# Patient Record
Sex: MALE | Race: BLACK OR AFRICAN AMERICAN | NOT HISPANIC OR LATINO | Employment: OTHER | URBAN - METROPOLITAN AREA
[De-identification: names, ages, dates, MRNs, and addresses within clinical notes are randomized per-mention and may not be internally consistent; named-entity substitution may affect disease eponyms.]

---

## 2017-02-23 ENCOUNTER — PRE VISIT (OUTPATIENT)
Dept: UROLOGY | Facility: CLINIC | Age: 32
End: 2017-02-23

## 2017-02-23 NOTE — TELEPHONE ENCOUNTER
PREVISIT INFORMATION                                                    Quan Hayward scheduled for future visit at [facility] specialty clinics.    Patient is scheduled to see Dr. Vidal on 2/27/17  Reason for visit: azoospermia - discuss options   Referring provider: Earnest Barnett  Has patient seen previous specialist? YES  Medical Records:  Available in chart.  Patient was previously seen at a [place] or [facility] facility.     REVIEW                                                      New patient packet mailed to patient: Yes  Medication reconciliation complete: No      PLAN/FOLLOW-UP NEEDED                                                      Patient Reminders Given:    Informed patient to bring an updated list of allergies, medications, pharmacy details and insurance information. Directed patient to come to the 2nd floor, check-in #4 for their appointment. Informed patient to call back if appointment needs to be cancelled or rescheduled at [phone].    Reminded patient to bring any outside records regarding this appointment or have them faxed to clinic at [phone].    Reminded patient to complete and bring in urology questionnaire. Also for patient to please come with a full bladder and to ask , if early to get staff member for sample.

## 2017-04-17 ENCOUNTER — TELEPHONE (OUTPATIENT)
Dept: UROLOGY | Facility: CLINIC | Age: 32
End: 2017-04-17

## 2017-04-17 ENCOUNTER — OFFICE VISIT (OUTPATIENT)
Dept: UROLOGY | Facility: CLINIC | Age: 32
End: 2017-04-17
Payer: COMMERCIAL

## 2017-04-17 VITALS
OXYGEN SATURATION: 99 % | DIASTOLIC BLOOD PRESSURE: 82 MMHG | HEART RATE: 73 BPM | BODY MASS INDEX: 34.51 KG/M2 | SYSTOLIC BLOOD PRESSURE: 116 MMHG | WEIGHT: 268.8 LBS | TEMPERATURE: 98.2 F

## 2017-04-17 DIAGNOSIS — N46.01 AZOOSPERMIA: ICD-10-CM

## 2017-04-17 DIAGNOSIS — Q98.4 KLINEFELTER'S SYNDROME: Primary | ICD-10-CM

## 2017-04-17 PROCEDURE — 99202 OFFICE O/P NEW SF 15 MIN: CPT | Performed by: UROLOGY

## 2017-04-17 RX ORDER — CEFAZOLIN SODIUM 1 G/50ML
3 SOLUTION INTRAVENOUS
Status: CANCELLED | OUTPATIENT
Start: 2017-04-17

## 2017-04-17 RX ORDER — ANASTROZOLE 1 MG/1
1 TABLET ORAL DAILY
Qty: 90 TABLET | Refills: 1 | Status: SHIPPED | OUTPATIENT
Start: 2017-04-17 | End: 2017-07-10

## 2017-04-17 RX ORDER — CEFAZOLIN SODIUM 1 G/3ML
1 INJECTION, POWDER, FOR SOLUTION INTRAMUSCULAR; INTRAVENOUS SEE ADMIN INSTRUCTIONS
Status: CANCELLED | OUTPATIENT
Start: 2017-04-17

## 2017-04-17 ASSESSMENT — PAIN SCALES - GENERAL: PAINLEVEL: NO PAIN (0)

## 2017-04-17 NOTE — TELEPHONE ENCOUNTER
Left the patient a detailed message with surgery details. Left my direct number for any questions. Surgery packet is being sent out today.

## 2017-04-17 NOTE — MR AVS SNAPSHOT
After Visit Summary   4/17/2017    Quan Quintero [facility]    MRN: [id number]           Patient Information     Date Of Birth          1985        Visit Information        Provider Department      4/17/2017 1:00 PM Anthony Vidal MD [facility]        Today's Diagnoses     Klinefelter's syndrome    -  1    Azoospermia           Follow-ups after your visit        Your next 10 appointments already scheduled     May 15, 2017  7:30 AM CDT   LAB with [facility] LAB   [facility] ([address] [address]   [phone]           OUTSIDE LABS:  Please include name of facility and Physician that is requesting outside labs be drawn.  Please indicate if labs are fasting or non-fasting on appt notes.  Be as specific as you can on which labs are being drawn.            Jun 29, 2017  8:40 AM CDT   (Arrive by 8:25 AM)   Post-Op with Anthony Vidal MD   [facility] ([facility])    [address] [place] [id number] [phone]              Future tests that were ordered for you today     Open Future Orders        Priority Expected Expires Ordered    Follicle stimulating hormone Routine 5/17/2017 6/17/2017 4/17/2017    Testosterone total Routine 5/17/2017 6/17/2017 4/17/2017    Estradiol ultrasensitive Routine 5/17/2017 6/17/2017 4/17/2017    ALT Routine 5/17/2017 6/17/2017 4/17/2017    AST Routine 5/17/2017 6/17/2017 4/17/2017            Who to contact     If you have questions or need follow up information about today's clinic visit or your schedule please contact [facility] directly at [phone].  Normal or non-critical lab and imaging results will be communicated to you by MyChart, letter or phone within 4 business days after the clinic has received the results. If you do not hear from us within 7 days,  please contact the clinic through [employer] or phone. If you have a critical or abnormal lab result, we will notify you by phone as soon as possible.  Submit refill requests through Vaunte or call your pharmacy and they will forward the refill request to us. Please allow 3 business days for your refill to be completed.          Additional Information About Your Visit        Vaunte Information     Vaunte is an electronic gateway that provides easy, online access to your medical records. With Vaunte, you can request a clinic appointment, read your test results, renew a prescription or communicate with your care team.     To sign up for Vaunte visit the website at [email or web address]   You will be asked to enter the access code listed below, as well as some personal information. Please follow the directions to create your username and password.     Your access code is: [email or web address]  Expires: 2017  2:07 PM     Your access code will  in 90 days. If you need help or a new code, please contact your [facility] or call [phone] for assistance.        Care EveryWhere ID     This is your Care EveryWhere ID. This could be used by other organizations to access your [place] medical records  [id number]        Your Vitals Were     Pulse Temperature Pulse Oximetry BMI (Body Mass Index)          73 98.2  F (36.8  C) (Temporal) 99% 34.51 kg/m2         Blood Pressure from Last 3 Encounters:   17 116/82   16 109/78   09/14/15 104/72    Weight from Last 3 Encounters:   17 121.9 kg (268 lb 12.8 oz)   16 126.5 kg (278 lb 14.4 oz)   09/14/15 111.6 kg (246 lb)                 Today's Medication Changes          These changes are accurate as of: 17  2:07 PM.  If you have any questions, ask your nurse or doctor.               Start taking these medicines.        Dose/Directions    anastrozole 1 MG tablet   Commonly known as:  ARIMIDEX   Used for:   Klinefelter's syndrome, Azoospermia   Started by:  Anthony Vidal MD        Dose:  1 mg   Take 1 tablet (1 mg) by mouth daily   Quantity:  90 tablet   Refills:  1            Where to get your medicines      These medications were sent to [id number] - [place], [place] - [address] AT [facility] OF  & [facility]  [address] [place] [place] [address]     Phone:  [phone]     anastrozole 1 MG tablet                Primary Care Provider Office Phone # Fax #    Jere Viramontes [phone]       [facility] [address] [facility] [address]        Thank you!     Thank you for choosing [facility]  for your care. Our goal is always to provide you with excellent care. Hearing back from our patients is one way we can continue to improve our services. Please take a few minutes to complete the written survey that you may receive in the mail after your visit with us. Thank you!             Your Updated Medication List - Protect others around you: Learn how to safely use, store and throw away your medicines at www.disposemymeds.org.          This list is accurate as of: 4/17/17  2:07 PM.  Always use your most recent med list.                   Brand Name Dispense Instructions for use    anastrozole 1 MG tablet    ARIMIDEX    90 tablet    Take 1 tablet (1 mg) by mouth daily       ibuprofen 600 MG tablet    ADVIL/MOTRIN     TK ONE T PO Q 6-8 H PRN P

## 2017-04-17 NOTE — PROGRESS NOTES
Past pt of Dr. Barnett  Here for fertility follow-up, new patient to urology clinic.    Pt has normal Y-chromosome microdeletion testing.    Labs from 2015:  He has Klinefelter's Syndrome 47xxy.  Normal volume, normal pH azoospermia x 3.  Testosterone 167, E2 25.2, LH 20.2.  Assume high FSH also.  Prolactin 13.4     He has small firm bilateral testes consistent with KS.    Discussed fertility may be possible but would require surgical sperm acquisition and IVF/ICSI.    Discussed options of female egg retrieval first, and thaw eggs if sperm found on fresh TESE.  Other option is pursue male biopsy first and cryopreserve sperm if found and then pursue IVF.    Discussed with them that most likely we would NOT find sperm.  Discussed that even if sperm are found that likely they would not survive freezing given low quality/ quantity.  At the same time it's such a low chance of finding sperm I would hate for her to bank eggs first as they are not open to sperm donor.   They want to pursue biopsy to prove there is not sperm present.    A-  Klinefelter's Syndrome     Plan  - schedule TESE/sperm extraction.  Discussed with them there is very little chance of finding sperm.  - needs ID labs.  - start Arimidex.  - will also check inhibin.    20min visit, over 50% face to face in counseling/discussion of above issues.

## 2017-04-17 NOTE — NURSING NOTE
"Quan Hayward's goals for this visit include:   Chief Complaint   Patient presents with     Consult     Azoospermia Testicular hypofunction infertfility ref by Ericka        He requests these members of his care team be copied on today's visit information: YES PCP    Referring Provider:  Earnest Barnett MD   [facility]  [address] [facility] [address]  [place] [address]    Initial /82 (BP Location: Left arm, Patient Position: Chair, Cuff Size: Adult Large)  Pulse 73  Temp 98.2  F (36.8  C) (Temporal)  Wt 121.9 kg (268 lb 12.8 oz)  SpO2 99%  BMI 34.51 kg/m2 Estimated body mass index is 34.51 kg/(m^2) as calculated from the following:    Height as of 12/5/16: 1.88 m (6' 2\").    Weight as of this encounter: 121.9 kg (268 lb 12.8 oz).  BP completed using cuff size: large        "

## 2017-04-18 DIAGNOSIS — Q98.4 KLINEFELTER'S SYNDROME: Primary | ICD-10-CM

## 2017-05-15 DIAGNOSIS — Q98.4 KLINEFELTER'S SYNDROME: ICD-10-CM

## 2017-05-15 DIAGNOSIS — N46.01 AZOOSPERMIA: ICD-10-CM

## 2017-05-15 LAB
ALT SERPL W P-5'-P-CCNC: 30 U/L (ref 0–70)
AST SERPL W P-5'-P-CCNC: 19 U/L (ref 0–45)
FSH SERPL-ACNC: 33.7 IU/L (ref 0.7–10.8)
HBV SURFACE AG SERPL QL IA: NONREACTIVE
HCV AB SERPL QL IA: NORMAL
HIV 1+2 AB+HIV1 P24 AG SERPL QL IA: NORMAL

## 2017-05-15 PROCEDURE — 84450 TRANSFERASE (AST) (SGOT): CPT | Performed by: UROLOGY

## 2017-05-15 PROCEDURE — 84460 ALANINE AMINO (ALT) (SGPT): CPT | Performed by: UROLOGY

## 2017-05-15 PROCEDURE — 84403 ASSAY OF TOTAL TESTOSTERONE: CPT | Performed by: UROLOGY

## 2017-05-15 PROCEDURE — 82670 ASSAY OF TOTAL ESTRADIOL: CPT | Performed by: UROLOGY

## 2017-05-15 PROCEDURE — 87340 HEPATITIS B SURFACE AG IA: CPT | Performed by: UROLOGY

## 2017-05-15 PROCEDURE — 86803 HEPATITIS C AB TEST: CPT | Performed by: UROLOGY

## 2017-05-15 PROCEDURE — 36415 COLL VENOUS BLD VENIPUNCTURE: CPT | Performed by: UROLOGY

## 2017-05-15 PROCEDURE — 86780 TREPONEMA PALLIDUM: CPT | Performed by: UROLOGY

## 2017-05-15 PROCEDURE — 87389 HIV-1 AG W/HIV-1&-2 AB AG IA: CPT | Performed by: UROLOGY

## 2017-05-15 PROCEDURE — 83001 ASSAY OF GONADOTROPIN (FSH): CPT | Performed by: UROLOGY

## 2017-05-16 LAB
T PALLIDUM IGG+IGM SER QL: NEGATIVE
TESTOST SERPL-MCNC: 233 NG/DL (ref 240–950)

## 2017-05-17 LAB — ESTRADIOL SERPL HS-MCNC: 10 PG/ML (ref 10–40)

## 2017-06-03 ENCOUNTER — TRANSFERRED RECORDS (OUTPATIENT)
Dept: HEALTH INFORMATION MANAGEMENT | Facility: CLINIC | Age: 32
End: 2017-06-03

## 2017-06-14 DIAGNOSIS — N46.01 AZOOSPERMIA: Primary | ICD-10-CM

## 2017-06-19 ENCOUNTER — ANESTHESIA EVENT (OUTPATIENT)
Dept: SURGERY | Facility: AMBULATORY SURGERY CENTER | Age: 32
End: 2017-06-19

## 2017-06-20 ENCOUNTER — HOSPITAL ENCOUNTER (OUTPATIENT)
Facility: AMBULATORY SURGERY CENTER | Age: 32
End: 2017-06-20
Attending: UROLOGY

## 2017-06-20 ENCOUNTER — SURGERY (OUTPATIENT)
Age: 32
End: 2017-06-20

## 2017-06-20 ENCOUNTER — TELEPHONE (OUTPATIENT)
Dept: UROLOGY | Facility: CLINIC | Age: 32
End: 2017-06-20

## 2017-06-20 ENCOUNTER — ANESTHESIA (OUTPATIENT)
Dept: SURGERY | Facility: AMBULATORY SURGERY CENTER | Age: 32
End: 2017-06-20

## 2017-06-20 VITALS
SYSTOLIC BLOOD PRESSURE: 116 MMHG | DIASTOLIC BLOOD PRESSURE: 84 MMHG | TEMPERATURE: 97.1 F | HEIGHT: 75 IN | RESPIRATION RATE: 22 BRPM | WEIGHT: 268 LBS | OXYGEN SATURATION: 98 % | BODY MASS INDEX: 33.32 KG/M2

## 2017-06-20 DIAGNOSIS — Q98.0 XXY KLINEFELTER'S SYNDROME: Primary | ICD-10-CM

## 2017-06-20 DIAGNOSIS — Q98.4 KLINEFELTER SYNDROME: Primary | ICD-10-CM

## 2017-06-20 PROCEDURE — 89264 IDENTIFY SPERM TISSUE: CPT | Mod: 91

## 2017-06-20 RX ORDER — ONDANSETRON 4 MG/1
4 TABLET, ORALLY DISINTEGRATING ORAL EVERY 30 MIN PRN
Status: DISCONTINUED | OUTPATIENT
Start: 2017-06-20 | End: 2017-06-21 | Stop reason: HOSPADM

## 2017-06-20 RX ORDER — ACETAMINOPHEN 325 MG/1
975 TABLET ORAL ONCE
Status: COMPLETED | OUTPATIENT
Start: 2017-06-20 | End: 2017-06-20

## 2017-06-20 RX ORDER — FENTANYL CITRATE 50 UG/ML
25-50 INJECTION, SOLUTION INTRAMUSCULAR; INTRAVENOUS
Status: DISCONTINUED | OUTPATIENT
Start: 2017-06-20 | End: 2017-06-21 | Stop reason: HOSPADM

## 2017-06-20 RX ORDER — HYDROCODONE BITARTRATE AND ACETAMINOPHEN 5; 325 MG/1; MG/1
1-2 TABLET ORAL ONCE
Status: DISCONTINUED | OUTPATIENT
Start: 2017-06-20 | End: 2017-06-21 | Stop reason: HOSPADM

## 2017-06-20 RX ORDER — ONDANSETRON 2 MG/ML
4 INJECTION INTRAMUSCULAR; INTRAVENOUS EVERY 30 MIN PRN
Status: DISCONTINUED | OUTPATIENT
Start: 2017-06-20 | End: 2017-06-21 | Stop reason: HOSPADM

## 2017-06-20 RX ORDER — MEPERIDINE HYDROCHLORIDE 25 MG/ML
12.5 INJECTION INTRAMUSCULAR; INTRAVENOUS; SUBCUTANEOUS
Status: DISCONTINUED | OUTPATIENT
Start: 2017-06-20 | End: 2017-06-21 | Stop reason: HOSPADM

## 2017-06-20 RX ORDER — PROPOFOL 10 MG/ML
INJECTION, EMULSION INTRAVENOUS PRN
Status: DISCONTINUED | OUTPATIENT
Start: 2017-06-20 | End: 2017-06-20

## 2017-06-20 RX ORDER — SODIUM CHLORIDE, SODIUM LACTATE, POTASSIUM CHLORIDE, CALCIUM CHLORIDE 600; 310; 30; 20 MG/100ML; MG/100ML; MG/100ML; MG/100ML
INJECTION, SOLUTION INTRAVENOUS CONTINUOUS
Status: DISCONTINUED | OUTPATIENT
Start: 2017-06-20 | End: 2017-06-21 | Stop reason: HOSPADM

## 2017-06-20 RX ORDER — PROPOFOL 10 MG/ML
INJECTION, EMULSION INTRAVENOUS CONTINUOUS PRN
Status: DISCONTINUED | OUTPATIENT
Start: 2017-06-20 | End: 2017-06-20

## 2017-06-20 RX ORDER — PROMETHAZINE HYDROCHLORIDE 25 MG/ML
12.5 INJECTION, SOLUTION INTRAMUSCULAR; INTRAVENOUS
Status: DISCONTINUED | OUTPATIENT
Start: 2017-06-20 | End: 2017-06-21 | Stop reason: HOSPADM

## 2017-06-20 RX ORDER — HYDROCODONE BITARTRATE AND ACETAMINOPHEN 5; 325 MG/1; MG/1
1 TABLET ORAL
Status: DISCONTINUED | OUTPATIENT
Start: 2017-06-20 | End: 2017-06-21 | Stop reason: HOSPADM

## 2017-06-20 RX ORDER — HYDROCODONE BITARTRATE AND ACETAMINOPHEN 5; 325 MG/1; MG/1
1-2 TABLET ORAL EVERY 4 HOURS PRN
Qty: 20 TABLET | Refills: 0 | Status: SHIPPED | OUTPATIENT
Start: 2017-06-20 | End: 2017-07-10

## 2017-06-20 RX ORDER — CEFAZOLIN SODIUM 1 G/50ML
3 SOLUTION INTRAVENOUS
Status: COMPLETED | OUTPATIENT
Start: 2017-06-20 | End: 2017-06-20

## 2017-06-20 RX ORDER — FENTANYL CITRATE 50 UG/ML
INJECTION, SOLUTION INTRAMUSCULAR; INTRAVENOUS PRN
Status: DISCONTINUED | OUTPATIENT
Start: 2017-06-20 | End: 2017-06-20

## 2017-06-20 RX ORDER — LIDOCAINE HYDROCHLORIDE 20 MG/ML
INJECTION, SOLUTION INFILTRATION; PERINEURAL PRN
Status: DISCONTINUED | OUTPATIENT
Start: 2017-06-20 | End: 2017-06-20

## 2017-06-20 RX ORDER — NALOXONE HYDROCHLORIDE 0.4 MG/ML
.1-.4 INJECTION, SOLUTION INTRAMUSCULAR; INTRAVENOUS; SUBCUTANEOUS
Status: DISCONTINUED | OUTPATIENT
Start: 2017-06-20 | End: 2017-06-21 | Stop reason: HOSPADM

## 2017-06-20 RX ORDER — BUPIVACAINE HYDROCHLORIDE 5 MG/ML
INJECTION, SOLUTION PERINEURAL PRN
Status: DISCONTINUED | OUTPATIENT
Start: 2017-06-20 | End: 2017-06-20 | Stop reason: HOSPADM

## 2017-06-20 RX ORDER — CEFAZOLIN SODIUM 1 G/3ML
1 INJECTION, POWDER, FOR SOLUTION INTRAMUSCULAR; INTRAVENOUS SEE ADMIN INSTRUCTIONS
Status: DISCONTINUED | OUTPATIENT
Start: 2017-06-20 | End: 2017-06-20 | Stop reason: HOSPADM

## 2017-06-20 RX ORDER — LIDOCAINE 40 MG/G
CREAM TOPICAL
Status: DISCONTINUED | OUTPATIENT
Start: 2017-06-20 | End: 2017-06-20 | Stop reason: HOSPADM

## 2017-06-20 RX ORDER — ONDANSETRON 2 MG/ML
INJECTION INTRAMUSCULAR; INTRAVENOUS PRN
Status: DISCONTINUED | OUTPATIENT
Start: 2017-06-20 | End: 2017-06-20

## 2017-06-20 RX ORDER — SODIUM CHLORIDE, SODIUM LACTATE, POTASSIUM CHLORIDE, CALCIUM CHLORIDE 600; 310; 30; 20 MG/100ML; MG/100ML; MG/100ML; MG/100ML
INJECTION, SOLUTION INTRAVENOUS CONTINUOUS
Status: DISCONTINUED | OUTPATIENT
Start: 2017-06-20 | End: 2017-06-20 | Stop reason: HOSPADM

## 2017-06-20 RX ORDER — GABAPENTIN 300 MG/1
300 CAPSULE ORAL ONCE
Status: COMPLETED | OUTPATIENT
Start: 2017-06-20 | End: 2017-06-20

## 2017-06-20 RX ADMIN — PROPOFOL 50 MG: 10 INJECTION, EMULSION INTRAVENOUS at 13:11

## 2017-06-20 RX ADMIN — PROPOFOL 75 MCG/KG/MIN: 10 INJECTION, EMULSION INTRAVENOUS at 13:11

## 2017-06-20 RX ADMIN — GABAPENTIN 300 MG: 300 CAPSULE ORAL at 11:31

## 2017-06-20 RX ADMIN — CEFAZOLIN SODIUM 3 G: 1 SOLUTION INTRAVENOUS at 13:17

## 2017-06-20 RX ADMIN — SODIUM CHLORIDE, SODIUM LACTATE, POTASSIUM CHLORIDE, CALCIUM CHLORIDE: 600; 310; 30; 20 INJECTION, SOLUTION INTRAVENOUS at 12:53

## 2017-06-20 RX ADMIN — FENTANYL CITRATE 50 MCG: 50 INJECTION, SOLUTION INTRAMUSCULAR; INTRAVENOUS at 13:13

## 2017-06-20 RX ADMIN — FENTANYL CITRATE 50 MCG: 50 INJECTION, SOLUTION INTRAMUSCULAR; INTRAVENOUS at 13:06

## 2017-06-20 RX ADMIN — FENTANYL CITRATE 50 MCG: 50 INJECTION, SOLUTION INTRAMUSCULAR; INTRAVENOUS at 14:32

## 2017-06-20 RX ADMIN — BUPIVACAINE HYDROCHLORIDE 2 ML: 5 INJECTION, SOLUTION PERINEURAL at 14:02

## 2017-06-20 RX ADMIN — LIDOCAINE HYDROCHLORIDE 60 MG: 20 INJECTION, SOLUTION INFILTRATION; PERINEURAL at 13:07

## 2017-06-20 RX ADMIN — ONDANSETRON 4 MG: 2 INJECTION INTRAMUSCULAR; INTRAVENOUS at 13:08

## 2017-06-20 RX ADMIN — ACETAMINOPHEN 975 MG: 325 TABLET ORAL at 11:31

## 2017-06-20 RX ADMIN — PROPOFOL 150 MG: 10 INJECTION, EMULSION INTRAVENOUS at 13:23

## 2017-06-20 RX ADMIN — PROPOFOL: 10 INJECTION, EMULSION INTRAVENOUS at 13:48

## 2017-06-20 NOTE — DISCHARGE INSTRUCTIONS
[facility]  Home Care Following Anesthesia  For 24 hours after surgery:  1. Get plenty of rest.  A responsible adult must stay with you for at least 24 hours after you leave the surgery center.  2. Do not drive or use heavy equipment.  If you have weakness or tingling, don't drive or use heavy equipment until this feeling goes away.   3. Do not drink alcohol.   4. Avoid strenuous or risky activities.  Ask for help when climbing stairs.  5. You may feel lightheaded.  IF so, sit for a few minutes before standing.  Have someone help you get up.   6. If you have nausea (feel sick to your stomach): Drink only clear liquids such as apple juice, ginger ale, broth or 7-Up.  Rest may also help.  Be sure to drink enough fluids.  Move to a regular diet as you feel able.   7. You may have a slight fever.  Call the doctor if your fever is over 100 F (37.7 C) (taken under the tongue) or lasts longer than 24 hours.  8. You may have a dry mouth, a sore throat, muscle aches or trouble sleeping. These should go away after 24 hours.  9. Do not make important or legal decisions.     Tips for taking pain medications  To get the best pain relief possible, remember these points:    Take pain medications as directed, before pain becomes severe.    Pain medication can upset your stomach: taking it with food may help.    Constipation is a common side effect of pain medication. Drink plenty of  fluids.    Eat foods high in fiber. Take a stool softener if recommended by your doctor or pharmacist.    Do not drink alcohol, drive or operate machinery while taking pain medications.    Ask about other ways to control pain, such as with heat, ice or relaxation.    Call a doctor for any of the followin. Signs of infection (fever, growing tenderness at the surgery site, a large amount of drainage or bleeding, severe pain, foul-smelling drainage, redness, swelling).  2. It has been over 8 to 10 hours since  surgery and you are still not able to urinate (pass water).  3. Headache for over 24 hours.  4. Numbness, tingling or weakness the day after surgery (if you had spinal anesthesia).  Your doctor is:  Dr. Anthony Vidal, Prostate and Urology: [phone] Or dial [phone] and ask for the resident on call for:  Prostate Urology  For emergency care, call the:  [facility]:  [phone] (TTY for hearing impaired: [phone])

## 2017-06-20 NOTE — IP AVS SNAPSHOT
[facility]    [address] [place] [address]    Phone:  [phone]    Fax:  [phone]                                       After Visit Summary   6/20/2017    Quan Hayward    MRN: [id number]           After Visit Summary Signature Page     I have received my discharge instructions, and my questions have been answered. I have discussed any challenges I see with this plan with the nurse or doctor.    ..........................................................................................................................................  Patient/Patient Representative Signature      ..........................................................................................................................................  Patient Representative Print Name and Relationship to Patient    ..................................................               ................................................  Date                                            Time    ..........................................................................................................................................  Reviewed by Signature/Title    ...................................................              ..............................................  Date                                                            Time

## 2017-06-20 NOTE — ANESTHESIA POSTPROCEDURE EVALUATION
Patient: Quan Hayward    Procedure(s):  Bilateral Testis Sperm Extraction - Wound Class: I-Clean    Diagnosis:Klinefelter's Syndrome, Azoospermia  Diagnosis Additional Information: No value filed.    Anesthesia Type:  General, LMA    Note:  Anesthesia Post Evaluation    Patient location during evaluation: PACU  Patient participation: Able to fully participate in evaluation  Level of consciousness: awake and alert  Pain management: adequate  Airway patency: patent  Cardiovascular status: hemodynamically stable  Respiratory status: nonlabored ventilation, spontaneous ventilation and room air  Hydration status: euvolemic  PONV: none     Anesthetic complications: None          Last vitals:  Vitals:    06/20/17 1100 06/20/17 1418   BP: 104/75 (!) 114/91   Resp: 16 (P) 21   Temp: 36.5  C (97.7  F) 35.9  C (96.6  F)   SpO2: 98%          Electronically Signed By: Earnest Prince MD  June 20, 2017  2:23 PM

## 2017-06-20 NOTE — ANESTHESIA CARE TRANSFER NOTE
Patient: Quan Hayward    Procedure(s):  Bilateral Testis Sperm Extraction - Wound Class: I-Clean    Diagnosis: Klinefelter's Syndrome, Azoospermia  Diagnosis Additional Information: No value filed.    Anesthesia Type:   General, LMA     Note:  Airway :Face Mask  Patient transferred to:PACU  Comments: VSS; report to RN      Vitals: (Last set prior to Anesthesia Care Transfer)    CRNA VITALS  6/20/2017 1345 - 6/20/2017 1421      6/20/2017             Pulse: 79    SpO2: 99 %    Resp Rate (observed): 8    EKG: NSR                Electronically Signed By: KAMLA Soto CRNA  June 20, 2017  2:21 PM

## 2017-06-20 NOTE — TELEPHONE ENCOUNTER
Called and discussed with pt's wife his biopsy results.    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    His baseline testosterone is low, recommended testosterone replacement therapy if he's not going to pursue microscopic testis sperm extraction ( micro-TESE).      He will follow-up as needed going forward.    Abigail ABDI

## 2017-06-20 NOTE — ANESTHESIA PREPROCEDURE EVALUATION
Anesthesia Evaluation     . Pt has had prior anesthetic.     No history of anesthetic complications          ROS/MED HX    ENT/Pulmonary:     (+)Intermittent asthma , . .    Neurologic:  - neg neurologic ROS     Cardiovascular:         METS/Exercise Tolerance:     Hematologic:  - neg hematologic  ROS       Musculoskeletal:         GI/Hepatic:         Renal/Genitourinary: Comment: Infertility (2/2 Klinefelter syn)        Endo:         Psychiatric:  - neg psychiatric ROS       Infectious Disease:         Malignancy:         Other:                     Physical Exam  Normal systems: dental    Airway   Mallampati: II  TM distance: >3 FB  Neck ROM: full    Dental     Cardiovascular   Rhythm and rate: regular      Pulmonary    breath sounds clear to auscultation                    Anesthesia Plan      History & Physical Review  History and physical reviewed and following examination; no interval change.    ASA Status:  2 .    NPO Status:  > 8 hours    Plan for General and LMA with Intravenous induction. Maintenance will be Balanced.    PONV prophylaxis:  Ondansetron (or other 5HT-3) and Dexamethasone or Solumedrol       Postoperative Care  Postoperative pain management:  Multi-modal analgesia and Oral pain medications.      Consents  Anesthetic plan, risks, benefits and alternatives discussed with:  Patient..                          .

## 2017-06-20 NOTE — OP NOTE
PREOPERATIVE DIAGNOSIS:  Azoospermia, Klinefelter syndrome  POSTOPERATIVE DIAGNOSIS: Same    PROCEDURE:   1. Bilateral testicular sperm extraction    ANESTHESIA: MAC and local    STAFF SURGEON:  Anthony Vidal MD present and scrubbed for entire procedure  RESIDENT SURGEON:  Raj Guevara MD    ESTIMATED BLOOD LOSS: <2 mL  COMPLICATIONS: None immediately.   DRAINS: None  SPECIMENS:   1. Testis biopsy and aspiration to fertility lab    FINDINGS: Sperm aspiration samples collected with a good amount of tissue saved for specimen, sent to fertility lab.     INDICATIONS: Quan Hayward is a(n) 32 year old male with a history of Klinefelter syndrome and azoospermia.  Dr Vidal discussed options for further management. He chose to proceed with a TESE/biopsy. Risks and possible complications were explain. After discussion of risks, benefits and alternatives, the patient agreed to proceed with the aforementioned procedures.    DESCRIPTION OF THE PROCEDURE: After verifying informed consent, the patient was brought to the operating room and placed in the supine position on the operating room table. All pressure points were adequately padded and pneumatic compression devices were applied to the patient's bilateral lower extremities. Pre-operative antibiotics were administered.  The patient was prepped and draped in the usual sterile fashion. A timeout was performed to confirm the correct patient, positioning, procedure, and laterality.     The right testicle was identified and 2cc of local anesthetic was applied to the superficial skin of the scrotum overlying the intended site of biopsy. A 15 blade scalpel was used to incise the skin, and iris scissors were used to dissect down through the tunica vaginalis. A 4-0 Monocryl suture was placed in the tunica albuginea to serve as a retractor.  A stab incision was then made into the tunica albuginea and samples of tubules were placed into Jose Angel's media.   Biopsies were then taken  from the tubules immediately under the incision site.    Next, a 14 gauge angiocath needle sheath attached to a 10cc syringe was inserted through the testicular incision and moved throughout the testicle in a 1cm local area to free tubules under the syringe's vacuum. The needle was withdrawn and the tubule tissue was grasped with forceps and collected. This aspiration technique was performed 2-3 times total through the same site with withdrawal of small amount of tissue.    Electrocautery was then used to achieve hemostasis. A 4-0 monocryl suture was then used to close the tunica albuginea in running fashion.  The tunica vaginalis was then closed in a running fashion using 3-0 chromic suture.      This procedure was repeated as described above on the left testicle. The dartos tissue was closed with 3-0 chromic suture and the skin was closed with the same suture in an interrupted fashion. Topical bacitracin, scrotal support and fluffs were applied.     All sponge and instrument counts were correct x2 at the end of the procedure as reported to me.   The patient was awoken from anesthesia and transferred to the PACU in stable condition.    I was present and scrubbed for the entire procedure.  Anthony Vidal MD  Urology Staff

## 2017-06-20 NOTE — IP AVS SNAPSHOT
"                  MRN:[id number]                      After Visit Summary   6/20/2017    Quan Hayward    MRN: [id number]           Thank you!     Thank you for choosing [facility] for your care. Our goal is always to provide you with excellent care. Hearing back from our patients is one way we can continue to improve our services. Please take a few minutes to complete the written survey that you may receive in the mail after you visit with us. Thank you!        Patient Information     Date Of Birth          1985        About your hospital stay     You were admitted on:  June 20, 2017 [facility]    You were discharged on:  June 20, 2017       Who to Call     For medical emergencies, please call [phone].  For non-urgent questions about your medical care, please call your primary care provider or clinic, [phone]  For questions related to your surgery, please call your surgery clinic        Attending Provider     Provider Specialty    Anthony Vidal MD Urology       Primary Care Provider Office Phone # Fax #    Jere Viramontes [phone]      After Care Instructions     Activity       - No lifting over 15 pounds and no strenuous physical activity for 4 weeks  - Wear jock strap as much as possible for the next 5 days. Remove gauze packing tomorrow  - Do not strain with bowel movements.  - Do not drive until you can press the brake pedal quickly and fully without pain.   - Do not operate a motor vehicle while taking narcotic pain medications            Contact Information       Phone numbers:   - Monday through Friday 8am to 4:30pm: Call [phone] with questions or to schedule or confirm appointment.    - Nights or weekends: call the after hours emergency pager - [phone] and tell the  \"I would like to page the Urology Resident on call.\"  - For emergencies, call [phone]            Diet Instructions       Resume pre procedure " diet            Encourage fluids       Encourage fluids at home to keep urine clear to light pink            Follow-Up       Follow-Up:  - Follow up with Dr. Vidal as previously scheduled  - Call your primary care provider to touch base regarding your recent procedure.   - Call or return sooner than your regularly scheduled visit if you develop any of the following: fever (greater than 101.5), uncontrolled pain, uncontrolled nausea or vomiting, as well as increased redness, swelling, or drainage from your wound.            Medication Instructions       - Transition from narcotic pain medications to tylenol (acetaminophen) as you are able.  Wean yourself off all pain medications as you are able.  - Some pain medications contain both tylenol (acetaminophen) and a narcotic (Norco, vicodin, percocet), do not take more than 4,000mg of Tylenol (acetaminophen) from all sources in any 24 hour period.  - Narcotics can make you constipated.  Take over the counter fiber (metamucil or benefiber) and stool softeners (miralax, docusate or senna) while taking narcotic pain medications, but stop if you develop diarrhea.  - No driving or operating machinery while taking narcotic pain medications            No Alcohol       for 24 hours post procedure            No driving or operating machinery        until the day after procedure                  Your next 10 appointments already scheduled     Jun 29, 2017  8:40 AM CDT   (Arrive by 8:25 AM)   Post-Op with Anthony Vidal MD   [facility]state [facility] Urologic Cancers ([facility])    [address] [place] [id number] [phone]              Further instructions from your care team       [facility]  Home Care Following Anesthesia  For 24 hours after surgery:  1. Get plenty of rest.  A responsible adult must stay with you for at least 24 hours after you leave the surgery  [place].  2. Do not drive or use heavy equipment.  If you have weakness or tingling, don't drive or use heavy equipment until this feeling goes away.   3. Do not drink alcohol.   4. Avoid strenuous or risky activities.  Ask for help when climbing stairs.  5. You may feel lightheaded.  IF so, sit for a few minutes before standing.  Have someone help you get up.   6. If you have nausea (feel sick to your stomach): Drink only clear liquids such as apple juice, ginger ale, broth or 7-Up.  Rest may also help.  Be sure to drink enough fluids.  Move to a regular diet as you feel able.   7. You may have a slight fever.  Call the doctor if your fever is over 100 F (37.7 C) (taken under the tongue) or lasts longer than 24 hours.  8. You may have a dry mouth, a sore throat, muscle aches or trouble sleeping. These should go away after 24 hours.  9. Do not make important or legal decisions.     Tips for taking pain medications  To get the best pain relief possible, remember these points:    Take pain medications as directed, before pain becomes severe.    Pain medication can upset your stomach: taking it with food may help.    Constipation is a common side effect of pain medication. Drink plenty of  fluids.    Eat foods high in fiber. Take a stool softener if recommended by your doctor or pharmacist.    Do not drink alcohol, drive or operate machinery while taking pain medications.    Ask about other ways to control pain, such as with heat, ice or relaxation.    Call a doctor for any of the followin. Signs of infection (fever, growing tenderness at the surgery site, a large amount of drainage or bleeding, severe pain, foul-smelling drainage, redness, swelling).  2. It has been over 8 to 10 hours since surgery and you are still not able to urinate (pass water).  3. Headache for over 24 hours.  4. Numbness, tingling or weakness the day after surgery (if you had spinal anesthesia).  Your doctor is:  Dr. Anthony Vidal, Prostate  "and Urology: [phone] Or dial [phone] and ask for the resident on call for:  Prostate Urology  For emergency care, call the:  [facility]:  [phone] (TTY for hearing impaired: [phone])                  Pending Results     No orders found from 2017 to 2017.            Admission Information     Date & Time Provider Department Dept. Phone    2017 Anthony Vidal MD [facility] [phone]      Your Vitals Were     Blood Pressure Temperature Respirations Height Weight Pulse Oximetry    116/84 96.6  F (35.9  C) (Temporal) 22 1.905 m (6' 3\") 121.6 kg (268 lb) 98%    BMI (Body Mass Index)                   33.5 kg/m2           [employer] Information     [facility] is an electronic gateway that provides easy, online access to your medical records. With [facility], you can request a clinic appointment, read your test results, renew a prescription or communicate with your care team.     To sign up for [facility] visit the website at [email or web address]   You will be asked to enter the access code listed below, as well as some personal information. Please follow the directions to create your username and password.     Your access code is: [email or web address]  Expires: 2017  2:07 PM     Your access code will  in 90 days. If you need help or a new code, please contact your [facility] or call [phone] for assistance.        Care EveryWhere ID     This is your Care EveryWhere ID. This could be used by other organizations to access your [facility] medical records  [id number]           Review of your medicines      START taking        Dose / Directions    HYDROcodone-acetaminophen 5-325 MG per tablet   Commonly known as:  NORCO   Used for:  Klinefelter syndrome        Dose:  1-2 tablet   Take 1-2 tablets by mouth every 4 hours as needed for moderate to severe pain maximum 12 tablet(s) per day   Quantity:  20 tablet "   Refills:  0         CONTINUE these medicines which have NOT CHANGED        Dose / Directions    anastrozole 1 MG tablet   Commonly known as:  ARIMIDEX   Used for:  Klinefelter's syndrome, Azoospermia        Dose:  1 mg   Take 1 tablet (1 mg) by mouth daily   Quantity:  90 tablet   Refills:  1       ibuprofen 600 MG tablet   Commonly known as:  ADVIL/MOTRIN        TK ONE T PO Q 6-8 H PRN P   Refills:  0            Where to get your medicines      Some of these will need a paper prescription and others can be bought over the counter. Ask your nurse if you have questions.     Bring a paper prescription for each of these medications     HYDROcodone-acetaminophen 5-325 MG per tablet                Protect others around you: Learn how to safely use, store and throw away your medicines at www.disposemymeds.org.             Medication List: This is a list of all your medications and when to take them. Check marks below indicate your daily home schedule. Keep this list as a reference.      Medications           Morning Afternoon Evening Bedtime As Needed    anastrozole 1 MG tablet   Commonly known as:  ARIMIDEX   Take 1 tablet (1 mg) by mouth daily                                HYDROcodone-acetaminophen 5-325 MG per tablet   Commonly known as:  NORCO   Take 1-2 tablets by mouth every 4 hours as needed for moderate to severe pain maximum 12 tablet(s) per day                                ibuprofen 600 MG tablet   Commonly known as:  ADVIL/MOTRIN   TK ONE T PO Q 6-8 H PRN P

## 2017-06-21 ENCOUNTER — TELEPHONE (OUTPATIENT)
Dept: UROLOGY | Facility: CLINIC | Age: 32
End: 2017-06-21

## 2017-06-21 LAB
ANALYSIS TEMP - CENTIGRADE: 23 CENTIGRADE
ANALYSIS TEMP - CENTIGRADE: NORMAL CENTIGRADE
COLLECTION METHOD: NORMAL
COLLECTION METHOD: NORMAL
COLLECTION SITE: NORMAL
COLLECTION SITE: NORMAL
CONSENT TO RELEASE TO PARTNER: NORMAL
CONSENT TO RELEASE TO PARTNER: NORMAL
LAB RECEIPT TIME: NORMAL
LAB RECEIPT TIME: NORMAL
TIME OF ANALYSIS: NORMAL
TIME OF ANALYSIS: NORMAL

## 2017-06-21 NOTE — TELEPHONE ENCOUNTER
Will forward for Dr. Vidal to review and return call.    Darline Espana RN, BSN  [facility]  Urology/General Surgery

## 2017-06-26 ENCOUNTER — PRE VISIT (OUTPATIENT)
Dept: UROLOGY | Facility: CLINIC | Age: 32
End: 2017-06-26

## 2017-06-26 NOTE — TELEPHONE ENCOUNTER
Patient coming in for Bilateral testicular sperm extraction surgical follow up. Records in epic. No need to contact patient

## 2017-07-10 ENCOUNTER — OFFICE VISIT (OUTPATIENT)
Dept: UROLOGY | Facility: CLINIC | Age: 32
End: 2017-07-10
Payer: COMMERCIAL

## 2017-07-10 VITALS
SYSTOLIC BLOOD PRESSURE: 99 MMHG | OXYGEN SATURATION: 96 % | DIASTOLIC BLOOD PRESSURE: 70 MMHG | TEMPERATURE: 98 F | HEART RATE: 49 BPM

## 2017-07-10 DIAGNOSIS — Q98.0 XXY KLINEFELTER'S SYNDROME: ICD-10-CM

## 2017-07-10 DIAGNOSIS — N46.01 AZOOSPERMIA: ICD-10-CM

## 2017-07-10 DIAGNOSIS — E29.1 TESTICULAR HYPOFUNCTION: ICD-10-CM

## 2017-07-10 DIAGNOSIS — Z09 POSTOP CHECK: Primary | ICD-10-CM

## 2017-07-10 PROCEDURE — 99213 OFFICE O/P EST LOW 20 MIN: CPT | Performed by: UROLOGY

## 2017-07-10 ASSESSMENT — PAIN SCALES - GENERAL: PAINLEVEL: NO PAIN (0)

## 2017-07-10 NOTE — NURSING NOTE
"Quan Hayward's goals for this visit include:   Chief Complaint   Patient presents with     Consult     pos op Bilateral testicular sperm extraction      He requests these members of his care team be copied on today's visit information: yes     Referring Provider:  ESTABLISHED PATIENT  No address on file    Initial BP 99/70 (BP Location: Left arm, Patient Position: Chair, Cuff Size: Adult Large)  Pulse (!) 49  Temp 98  F (36.7  C) (Oral)  SpO2 96% Estimated body mass index is 33.5 kg/(m^2) as calculated from the following:    Height as of 6/20/17: 1.905 m (6' 3\").    Weight as of 6/20/17: 121.6 kg (268 lb).  BP completed using cuff size: large        "

## 2017-07-10 NOTE — PROGRESS NOTES
CC: Quan Hayward is post-op from bilateral TESE.  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    HPI: Patient is ~2 wks post op.  He has been doing well. No fevers or chills. Pain decreasing.   Exam: BP 99/70 (BP Location: Left arm, Patient Position: Chair, Cuff Size: Adult Large)  Pulse (!) 49  Temp 98  F (36.7  C) (Oral)  SpO2 96% NAD.   Incision healed well. No discharge, erythema or fluctuence suggestive of infection.   He has some trace right and mild left testis sensitivity.  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    PLAN:    I discussed with him that a scant amount of dysmorphic sperm were seen, but these were not adequate for cryopreservation. They would not have been adequate for IVF even if we were doing it that same day.     I again discussed with them that the chance for biologic children would be to have an egg retrieval and freeze the eggs ahead of time, then pursue another testicle biopsy and if adequate sperm are found to use the sperm to fertilize the eggs. They're pursuing IVF through the [facility]. I encouraged them to follow-up with Dr. Garcia for egg retrieval as a next step. A repeat right side testicle biopsy would be performed after egg freezing and if adequate sperm were found the eggs could be thawed and hopefully fertilized.  I had encouraged them to pursue the egg freezing the first time around, but they were just wanting a routine biopsy first.    Discussed with them I would pursue microscopic testis sperm extraction ( micro-TESE) on the next procedure and I would optimize his hormones for 3-6 months ahead of time with arimidex and or Clomid.    For logistics and cost, it might be easiest for them to pursue the retrieval through Dr. Sun, for ease of specimen transport to [facility], which would be fine with me.    CC: BFC    15min visit, over 50% face to face in  counseling/discussion of above issues.

## 2017-07-10 NOTE — MR AVS SNAPSHOT
After Visit Summary   7/10/2017    Quan Quintero [facility]    MRN: [id number]           Patient Information     Date Of Birth          1985        Visit Information        Provider Department      7/10/2017 10:30 AM Anthony Vidal MD [facility]        Today's Diagnoses     Postop check    -  1    XXY Klinefelter's syndrome        Azoospermia        Testicular hypofunction           Follow-ups after your visit        Follow-up notes from your care team     Return if symptoms worsen or fail to improve.      Who to contact     If you have questions or need follow up information about today's clinic visit or your schedule please contact [facility] directly at [phone].  Normal or non-critical lab and imaging results will be communicated to you by MyChart, letter or phone within 4 business days after the clinic has received the results. If you do not hear from us within 7 days, please contact the clinic through MyChart or phone. If you have a critical or abnormal lab result, we will notify you by phone as soon as possible.  Submit refill requests through [employer] or call your pharmacy and they will forward the refill request to us. Please allow 3 business days for your refill to be completed.          Additional Information About Your Visit        MyChart Information     [facility] is an electronic gateway that provides easy, online access to your medical records. With [facility], you can request a clinic appointment, read your test results, renew a prescription or communicate with your care team.     To sign up for [facility] visit the website at [email or web address]   You will be asked to enter the access code listed below, as well as some personal information. Please follow the directions to create your username and password.     Your access code is: [email or web address]  Expires: 2017  2:07 PM     Your access code will  in 90 days. If you need help or a new code,  please contact your [facility] or call [phone] for assistance.        Care EveryWhere ID     This is your Care EveryWhere ID. This could be used by other organizations to access your [facility] medical records  [id number]        Your Vitals Were     Pulse Temperature Pulse Oximetry             49 98  F (36.7  C) (Oral) 96%          Blood Pressure from Last 3 Encounters:   07/10/17 99/70   06/20/17 116/84   04/17/17 116/82    Weight from Last 3 Encounters:   06/20/17 121.6 kg (268 lb)   04/17/17 121.9 kg (268 lb 12.8 oz)   12/05/16 126.5 kg (278 lb 14.4 oz)              Today, you had the following     No orders found for display       Primary Care Provider Office Phone # Fax #    Jere Viramontes [phone]       [facility] [address] [place] [id number]        Equal Access to Services     CORDELIA MUNOZ : Hadii aad ku hadasho Soomaali, waaxda luqadaha, qaybta kaalmada adeegyada, waxay idiin hayaan adeeg kharash la'ronnien . So [facility] [phone].    ATENCIÓN: Si habla español, tiene a alonso disposición servicios gratuitos de asistencia lingüística. Llame al [phone].    We comply with applicable federal civil rights laws and [place] laws. We do not discriminate on the basis of race, color, national origin, age, disability sex, sexual orientation or gender identity.            Thank you!     Thank you for choosing [facility]  for your care. Our goal is always to provide you with excellent care. Hearing back from our patients is one way we can continue to improve our services. Please take a few minutes to complete the written survey that you may receive in the mail after your visit with us. Thank you!             Your Updated Medication List - Protect others around you: Learn how to safely use, store and throw away your medicines at www.disposemymeds.org.      Notice  As of 7/10/2017  2:37 PM    You have not been prescribed any  medications.

## 2017-07-10 NOTE — LETTER
7/10/2017      RE: Quan Hayward  [address]   [place] [address]       CC: Quan Hayward is post-op from bilateral TESE.  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    HPI: Patient is ~2 wks post op.  He has been doing well. No fevers or chills. Pain decreasing.   Exam: BP 99/70 (BP Location: Left arm, Patient Position: Chair, Cuff Size: Adult Large)  Pulse (!) 49  Temp 98  F (36.7  C) (Oral)  SpO2 96% NAD.   Incision healed well. No discharge, erythema or fluctuence suggestive of infection.   He has some trace right and mild left testis sensitivity.  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    PLAN:    I discussed with him that a scant amount of dysmorphic sperm were seen, but these were not adequate for cryopreservation. They would not have been adequate for IVF even if we were doing it that same day.     I again discussed with them that the chance for biologic children would be to have an egg retrieval and freeze the eggs ahead of time, then pursue another testicle biopsy and if adequate sperm are found to use the sperm to fertilize the eggs. They're pursuing IVF through the [facility]. I encouraged them to follow-up with Dr. Garcia for egg retrieval as a next step. A repeat right side testicle biopsy would be performed after egg freezing and if adequate sperm were found the eggs could be thawed and hopefully fertilized.  I had encouraged them to pursue the egg freezing the first time around, but they were just wanting a routine biopsy first.    Discussed with them I would pursue microscopic testis sperm extraction ( micro-TESE) on the next procedure and I would optimize his hormones for 3-6 months ahead of time with arimidex and or Clomid.    For logistics and cost, it might be easiest for them to pursue the retrieval through Dr. Sun, for ease of specimen transport to [facility],  which would be fine with me.    CC: BFC    15min visit, over 50% face to face in counseling/discussion of above issues.         Anthony Vidal MD

## 2018-05-11 DIAGNOSIS — N46.01 AZOOSPERMIA: ICD-10-CM

## 2018-05-11 DIAGNOSIS — Q98.4 KLINEFELTER'S SYNDROME: ICD-10-CM

## 2018-05-11 NOTE — TELEPHONE ENCOUNTER
Medication request: anastrozole (ARIMIDEX) 1 MG tablet   Sig: Take 1 tablet (1 mg) by mouth daily  Prescription written: 4/17/17  Last Qty: 90  Pt's last office visit: 7/10/17  Next scheduled office visit: none    Will forward refill request to Dr. Vidal to review and sign if appropriate.    Darline Espana RN, BSN

## 2018-05-11 NOTE — TELEPHONE ENCOUNTER
[facility] Call Center    Phone Message    May a detailed message be left on voicemail: yes    Reason for Call: Medication Refill Request    Has the patient contacted the pharmacy for the refill? Yes   Name of medication being requested: anastrozole (ARIMIDEX) 1 MG tablet   Provider who prescribed the medication: Dr. Vidal  Pharmacy: [facility]  Date medication is needed: As soon as possible       Action Taken: Message routed to:  [facility] ([facility] Urology

## 2018-05-13 RX ORDER — ANASTROZOLE 1 MG/1
1 TABLET ORAL DAILY
Qty: 90 TABLET | Refills: 1 | OUTPATIENT
Start: 2018-05-13

## 2018-05-13 NOTE — TELEPHONE ENCOUNTER
Arimidex refill was requested.  This was given in anticipation of a testis biopsy for sperm extraction for IVF with another provider.  I'm not sure what the patient is doing with regards to fertility.  If not pursuing as testis biopsy does not need the medication.

## 2018-05-14 NOTE — TELEPHONE ENCOUNTER
"Called and spoke to Eleanor, patient's spouse as consent to communicate is on file. Informed spouse of note below from Dr. Vidal. Spouse stated, \"We are seeing another doctor and they agree with Dr. Vidal for the arimidex.\" Per spouse, the other doctor that the patient is following with prescribed the arimidex over the weekend and they are continuing to follow with the other provider. Informed spouse to return call to clinic with any questions in the future.    Darline Espana RN, BSN    "

## 2021-04-18 ENCOUNTER — HEALTH MAINTENANCE LETTER (OUTPATIENT)
Age: 36
End: 2021-04-18

## 2021-10-03 ENCOUNTER — HEALTH MAINTENANCE LETTER (OUTPATIENT)
Age: 36
End: 2021-10-03

## 2022-05-14 ENCOUNTER — HEALTH MAINTENANCE LETTER (OUTPATIENT)
Age: 37
End: 2022-05-14

## 2022-09-04 ENCOUNTER — HEALTH MAINTENANCE LETTER (OUTPATIENT)
Age: 37
End: 2022-09-04

## 2023-06-03 ENCOUNTER — HEALTH MAINTENANCE LETTER (OUTPATIENT)
Age: 38
End: 2023-06-03

## 2024-01-10 ENCOUNTER — HOSPITAL ENCOUNTER (EMERGENCY)
Facility: CLINIC | Age: 39
Discharge: HOME | End: 2024-01-10
Attending: FAMILY MEDICINE | Admitting: FAMILY MEDICINE
Payer: COMMERCIAL

## 2024-01-10 ENCOUNTER — APPOINTMENT (OUTPATIENT)
Dept: CT IMAGING | Facility: CLINIC | Age: 39
End: 2024-01-10
Attending: FAMILY MEDICINE
Payer: COMMERCIAL

## 2024-01-10 VITALS
OXYGEN SATURATION: 100 % | RESPIRATION RATE: 16 BRPM | HEART RATE: 77 BPM | SYSTOLIC BLOOD PRESSURE: 122 MMHG | DIASTOLIC BLOOD PRESSURE: 78 MMHG | TEMPERATURE: 98.3 F

## 2024-01-10 DIAGNOSIS — R04.2 BLOOD-TINGED SPUTUM: Primary | ICD-10-CM

## 2024-01-10 DIAGNOSIS — R07.9 LEFT-SIDED CHEST PAIN: ICD-10-CM

## 2024-01-10 DIAGNOSIS — F10.91 ALCOHOL USE DISORDER IN REMISSION: ICD-10-CM

## 2024-01-10 DIAGNOSIS — R10.12 LEFT UPPER QUADRANT ABDOMINAL PAIN: ICD-10-CM

## 2024-01-10 LAB
ALBUMIN SERPL BCG-MCNC: 4.6 G/DL (ref 3.5–5.2)
ALBUMIN UR-MCNC: NEGATIVE MG/DL
ALP SERPL-CCNC: 81 U/L (ref 40–150)
ALT SERPL W P-5'-P-CCNC: 35 U/L (ref 0–70)
ANION GAP SERPL CALCULATED.3IONS-SCNC: 12 MMOL/L (ref 7–15)
APPEARANCE UR: CLEAR
AST SERPL W P-5'-P-CCNC: 31 U/L (ref 0–45)
BASOPHILS # BLD AUTO: 0 10E3/UL (ref 0–0.2)
BASOPHILS NFR BLD AUTO: 1 %
BILIRUB SERPL-MCNC: 0.8 MG/DL
BILIRUB UR QL STRIP: NEGATIVE
BUN SERPL-MCNC: 11.8 MG/DL (ref 6–20)
CALCIUM SERPL-MCNC: 9.4 MG/DL (ref 8.6–10)
CHLORIDE SERPL-SCNC: 103 MMOL/L (ref 98–107)
COLOR UR AUTO: YELLOW
CREAT SERPL-MCNC: 0.92 MG/DL (ref 0.67–1.17)
DEPRECATED HCO3 PLAS-SCNC: 25 MMOL/L (ref 22–29)
EGFRCR SERPLBLD CKD-EPI 2021: >90 ML/MIN/1.73M2
EOSINOPHIL # BLD AUTO: 0.3 10E3/UL (ref 0–0.7)
EOSINOPHIL NFR BLD AUTO: 6 %
ERYTHROCYTE [DISTWIDTH] IN BLOOD BY AUTOMATED COUNT: 12.8 % (ref 10–15)
FLUAV RNA SPEC QL NAA+PROBE: NEGATIVE
FLUBV RNA RESP QL NAA+PROBE: NEGATIVE
GLUCOSE SERPL-MCNC: 96 MG/DL (ref 70–99)
GLUCOSE UR STRIP-MCNC: NEGATIVE MG/DL
HCT VFR BLD AUTO: 46.1 % (ref 40–53)
HGB BLD-MCNC: 15.1 G/DL (ref 13.3–17.7)
HGB UR QL STRIP: NEGATIVE
HOLD SPECIMEN: NORMAL
IMM GRANULOCYTES # BLD: 0 10E3/UL
IMM GRANULOCYTES NFR BLD: 0 %
INR PPP: 1.09 (ref 0.85–1.15)
KETONES UR STRIP-MCNC: NEGATIVE MG/DL
LEUKOCYTE ESTERASE UR QL STRIP: NEGATIVE
LIPASE SERPL-CCNC: 36 U/L (ref 13–60)
LYMPHOCYTES # BLD AUTO: 1.8 10E3/UL (ref 0.8–5.3)
LYMPHOCYTES NFR BLD AUTO: 33 %
MCH RBC QN AUTO: 30.7 PG (ref 26.5–33)
MCHC RBC AUTO-ENTMCNC: 32.8 G/DL (ref 31.5–36.5)
MCV RBC AUTO: 94 FL (ref 78–100)
MONOCYTES # BLD AUTO: 0.5 10E3/UL (ref 0–1.3)
MONOCYTES NFR BLD AUTO: 9 %
MUCOUS THREADS #/AREA URNS LPF: PRESENT /LPF
NEUTROPHILS # BLD AUTO: 2.8 10E3/UL (ref 1.6–8.3)
NEUTROPHILS NFR BLD AUTO: 51 %
NITRATE UR QL: NEGATIVE
NRBC # BLD AUTO: 0 10E3/UL
NRBC BLD AUTO-RTO: 0 /100
PH UR STRIP: 5 [PH] (ref 5–7)
PLATELET # BLD AUTO: 348 10E3/UL (ref 150–450)
POTASSIUM SERPL-SCNC: 4.6 MMOL/L (ref 3.4–5.3)
PROT SERPL-MCNC: 8.2 G/DL (ref 6.4–8.3)
RBC # BLD AUTO: 4.92 10E6/UL (ref 4.4–5.9)
RBC URINE: 1 /HPF
RSV RNA SPEC NAA+PROBE: NEGATIVE
SARS-COV-2 RNA RESP QL NAA+PROBE: NEGATIVE
SODIUM SERPL-SCNC: 140 MMOL/L (ref 135–145)
SP GR UR STRIP: 1.01 (ref 1–1.03)
TROPONIN T SERPL HS-MCNC: <6 NG/L
UROBILINOGEN UR STRIP-MCNC: NORMAL MG/DL
WBC # BLD AUTO: 5.4 10E3/UL (ref 4–11)
WBC URINE: 1 /HPF

## 2024-01-10 PROCEDURE — 250N000013 HC RX MED GY IP 250 OP 250 PS 637: Performed by: FAMILY MEDICINE

## 2024-01-10 PROCEDURE — 85610 PROTHROMBIN TIME: CPT | Performed by: FAMILY MEDICINE

## 2024-01-10 PROCEDURE — 99284 EMERGENCY DEPT VISIT MOD MDM: CPT | Mod: 25 | Performed by: FAMILY MEDICINE

## 2024-01-10 PROCEDURE — 83690 ASSAY OF LIPASE: CPT | Performed by: FAMILY MEDICINE

## 2024-01-10 PROCEDURE — 81001 URINALYSIS AUTO W/SCOPE: CPT | Performed by: FAMILY MEDICINE

## 2024-01-10 PROCEDURE — 99285 EMERGENCY DEPT VISIT HI MDM: CPT | Mod: 25 | Performed by: FAMILY MEDICINE

## 2024-01-10 PROCEDURE — 84484 ASSAY OF TROPONIN QUANT: CPT | Performed by: FAMILY MEDICINE

## 2024-01-10 PROCEDURE — 93005 ELECTROCARDIOGRAM TRACING: CPT | Performed by: FAMILY MEDICINE

## 2024-01-10 PROCEDURE — 80053 COMPREHEN METABOLIC PANEL: CPT | Performed by: FAMILY MEDICINE

## 2024-01-10 PROCEDURE — 85025 COMPLETE CBC W/AUTO DIFF WBC: CPT | Performed by: FAMILY MEDICINE

## 2024-01-10 PROCEDURE — 93010 ELECTROCARDIOGRAM REPORT: CPT | Performed by: FAMILY MEDICINE

## 2024-01-10 PROCEDURE — 36415 COLL VENOUS BLD VENIPUNCTURE: CPT | Performed by: FAMILY MEDICINE

## 2024-01-10 PROCEDURE — 250N000011 HC RX IP 250 OP 636: Performed by: FAMILY MEDICINE

## 2024-01-10 PROCEDURE — 74177 CT ABD & PELVIS W/CONTRAST: CPT

## 2024-01-10 PROCEDURE — 250N000009 HC RX 250: Performed by: FAMILY MEDICINE

## 2024-01-10 PROCEDURE — 87637 SARSCOV2&INF A&B&RSV AMP PRB: CPT | Performed by: FAMILY MEDICINE

## 2024-01-10 RX ORDER — ACETAMINOPHEN 325 MG/1
975 TABLET ORAL ONCE
Status: COMPLETED | OUTPATIENT
Start: 2024-01-10 | End: 2024-01-10

## 2024-01-10 RX ORDER — IOPAMIDOL 755 MG/ML
94 INJECTION, SOLUTION INTRAVASCULAR ONCE
Status: COMPLETED | OUTPATIENT
Start: 2024-01-10 | End: 2024-01-10

## 2024-01-10 RX ADMIN — SODIUM CHLORIDE 100 ML: 9 INJECTION, SOLUTION INTRAVENOUS at 20:19

## 2024-01-10 RX ADMIN — IOPAMIDOL 94 ML: 755 INJECTION, SOLUTION INTRAVENOUS at 20:18

## 2024-01-10 RX ADMIN — ACETAMINOPHEN 975 MG: 325 TABLET, FILM COATED ORAL at 19:12

## 2024-01-10 ASSESSMENT — ACTIVITIES OF DAILY LIVING (ADL)
ADLS_ACUITY_SCORE: 35
ADLS_ACUITY_SCORE: 35

## 2024-01-10 ASSESSMENT — ENCOUNTER SYMPTOMS
NAUSEA: 0
CHILLS: 0
SHORTNESS OF BREATH: 0
FREQUENCY: 0
VOMITING: 0
HEADACHES: 0
ABDOMINAL PAIN: 0
SINUS PRESSURE: 0
SORE THROAT: 0
BLOOD IN STOOL: 0
CONSTIPATION: 0
PALPITATIONS: 0
WHEEZING: 0
DYSURIA: 0
COUGH: 1
DIARRHEA: 0
FEVER: 0
DIAPHORESIS: 0

## 2024-01-11 NOTE — ED NOTES
Report was called to RN at [facility] prior to sending pt back, pt was medically cleared and transported back to [facility] for further care and treatment

## 2024-01-11 NOTE — ED PROVIDER NOTES
History     Chief Complaint   Patient presents with    Abdominal Pain     HPI  Quan Hayward is a 38 year old male who presents from [facility] with history of alcohol abuse and marijuana abuse as well as tobacco abuse.  He has been there about 8 days.  He now has hemoptysis today.  Pain in the left lateral chest and left upper abdomen across the upper abdomen.  No known history of cirrhosis.  No blood in the stool or black tarry stools.  Previously tested for tuberculosis in the last year and was negative.  No weight loss.  He has no significant dyspnea.  There is also been a cough.  Some sense of dizziness.    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  This apparently occurred at the [place] but I went back and reviewed the records from that time.  And cannot see any reference to this.  He has also had some looser stools    Allergies:  No Known Allergies    Problem List:    Patient Active Problem List    Diagnosis Date Noted    Dehydration 09/16/2013     Priority: Medium    Cholecystitis 09/16/2013     Priority: Medium        Past Medical History:    Past Medical History:   Diagnosis Date    Mild intermittent asthma        Past Surgical History:    Past Surgical History:   Procedure Laterality Date    EXTRACT TESTICULAR SPERM Right 6/20/2017    Procedure: EXTRACT TESTICULAR SPERM;  Bilateral Testis Sperm Extraction;  Surgeon: Anthony Vidal MD;  Location:  OR    LAPAROSCOPIC CHOLECYSTECTOMY  9/17/2013    Procedure: LAPAROSCOPIC CHOLECYSTECTOMY;  LAPAROSCOPIC CHOLECYSTECTOMY ;  Surgeon: Sherri Bonilla MD;  Location:  OR       Family History:    Family History   Problem Relation Age of Onset    Gastrointestinal Disease No family hx of     Cancer No family hx of     Cerebrovascular Disease No family hx of     Thyroid Disease No family hx of     Glaucoma No family hx of      Macular Degeneration No family hx of     Hypertension Father     Diabetes Father     Cardiovascular Brother         ?congenital heart disease       Social History:  Marital Status:   [2]  Social History     Tobacco Use    Smoking status: Every Day     Packs/day: 1.00     Years: 10.00     Additional pack years: 0.00     Total pack years: 10.00     Types: Cigarettes    Smokeless tobacco: Never   Substance Use Topics    Alcohol use: No    Drug use: No        Medications:    No current outpatient medications on file.        Review of Systems   Constitutional:  Negative for chills, diaphoresis and fever.   HENT:  Negative for ear pain, sinus pressure and sore throat.    Eyes:  Negative for visual disturbance.   Respiratory:  Positive for cough. Negative for shortness of breath and wheezing.    Cardiovascular:  Negative for chest pain and palpitations.   Gastrointestinal:  Negative for abdominal pain, blood in stool, constipation, diarrhea, nausea and vomiting.   Genitourinary:  Negative for dysuria, frequency and urgency.   Skin:  Negative for rash.   Neurological:  Negative for headaches.   All other systems reviewed and are negative.      Physical Exam   BP: 121/82  Pulse: 56  Temp: 98.5  F (36.9  C)  Resp: 16  SpO2: 98 %      Physical Exam  Constitutional:       General: He is in acute distress.      Appearance: He is not diaphoretic.   HENT:      Head: Atraumatic.   Eyes:      Conjunctiva/sclera: Conjunctivae normal.   Cardiovascular:      Rate and Rhythm: Normal rate.      Heart sounds: No murmur heard.  Pulmonary:      Effort: Pulmonary effort is normal. No respiratory distress.      Breath sounds: Normal breath sounds. No stridor. No wheezing or rhonchi.   Abdominal:      General: Abdomen is flat. There is no distension.      Palpations: Abdomen is soft. There is no mass.      Tenderness: There is no abdominal tenderness. There is no guarding.   Musculoskeletal:      Cervical back: Neck supple.      Right  lower leg: No edema.      Left lower leg: No edema.   Skin:     Coloration: Skin is not pale.      Findings: No rash.   Neurological:      Mental Status: He is alert.      Motor: No weakness.         ED Course                 Procedures                EKG Interpretation:      Interpreted by Richmond Bland MD  EKG done at 1901 hrs. demonstrates a sinus rhythm at 55 bpm with a left axis note.  No significant ST change.  No T wave changes.  Normal R progression and no Q waves.  RSR prime lead V1.  Intervals normal no ectopy.  Normal conduction otherwise.  Impression sinus rhythm 55 bpm no acute change    Critical Care time:  none               Results for orders placed or performed during the hospital encounter of 01/10/24 (from the past 24 hour(s))   [place] Draw    Narrative    The following orders were created for panel order [place] Draw.  Procedure                               Abnormality         Status                     ---------                               -----------         ------                     Extra Blue Top Tube[[id number]]                              Final result               Extra Red Top Tube[[id number]]                               Final result               Extra Green Top (Lithium...[[id number]]                      Final result               Extra Purple Top Tube[[id number]]                            Final result                 Please view results for these tests on the individual orders.   Extra Blue Top Tube   Result Value Ref Range    Hold Specimen JIC    Extra Red Top Tube   Result Value Ref Range    Hold Specimen JIC    Extra Green Top (Lithium Heparin) Tube   Result Value Ref Range    Hold Specimen JIC    Extra Purple Top Tube   Result Value Ref Range    Hold Specimen JIC    CBC with platelets differential    Narrative    The following orders were created for panel order CBC with platelets differential.  Procedure                               Abnormality         Status                      ---------                               -----------         ------                     CBC with platelets and d...[[id number]]                      Final result                 Please view results for these tests on the individual orders.   Comprehensive metabolic panel   Result Value Ref Range    Sodium 140 135 - 145 mmol/L    Potassium 4.6 3.4 - 5.3 mmol/L    Carbon Dioxide (CO2) 25 22 - 29 mmol/L    Anion Gap 12 7 - 15 mmol/L    Urea Nitrogen 11.8 6.0 - 20.0 mg/dL    Creatinine 0.92 0.67 - 1.17 mg/dL    GFR Estimate >90 >60 mL/min/1.73m2    Calcium 9.4 8.6 - 10.0 mg/dL    Chloride 103 98 - 107 mmol/L    Glucose 96 70 - 99 mg/dL    Alkaline Phosphatase 81 40 - 150 U/L    AST 31 0 - 45 U/L    ALT 35 0 - 70 U/L    Protein Total 8.2 6.4 - 8.3 g/dL    Albumin 4.6 3.5 - 5.2 g/dL    Bilirubin Total 0.8 <=1.2 mg/dL   Lipase   Result Value Ref Range    Lipase 36 13 - 60 U/L   INR   Result Value Ref Range    INR 1.09 0.85 - 1.15   CBC with platelets and differential   Result Value Ref Range    WBC Count 5.4 4.0 - 11.0 10e3/uL    RBC Count 4.92 4.40 - 5.90 10e6/uL    Hemoglobin 15.1 13.3 - 17.7 g/dL    Hematocrit 46.1 40.0 - 53.0 %    MCV 94 78 - 100 fL    MCH 30.7 26.5 - 33.0 pg    MCHC 32.8 31.5 - 36.5 g/dL    RDW 12.8 10.0 - 15.0 %    Platelet Count 348 150 - 450 10e3/uL    % Neutrophils 51 %    % Lymphocytes 33 %    % Monocytes 9 %    % Eosinophils 6 %    % Basophils 1 %    % Immature Granulocytes 0 %    NRBCs per 100 WBC 0 <1 /100    Absolute Neutrophils 2.8 1.6 - 8.3 10e3/uL    Absolute Lymphocytes 1.8 0.8 - 5.3 10e3/uL    Absolute Monocytes 0.5 0.0 - 1.3 10e3/uL    Absolute Eosinophils 0.3 0.0 - 0.7 10e3/uL    Absolute Basophils 0.0 0.0 - 0.2 10e3/uL    Absolute Immature Granulocytes 0.0 <=0.4 10e3/uL    Absolute NRBCs 0.0 10e3/uL   Troponin T, High Sensitivity   Result Value Ref Range    Troponin T, High Sensitivity <6 <=22 ng/L   Symptomatic Influenza A/B, RSV, & SARS-CoV2 PCR (COVID-19) Nose    Specimen:  Nose; Swab   Result Value Ref Range    Influenza A PCR Negative Negative    Influenza B PCR Negative Negative    RSV PCR Negative Negative    SARS CoV2 PCR Negative Negative    Narrative    Testing was performed using the Xpert Xpress CoV2/Flu/RSV Assay on the [employer] GeneXpert Instrument. This test should be ordered for the detection of SARS-CoV-2, influenza, and RSV viruses in individuals who meet clinical and/or epidemiological criteria. Test performance is unknown in asymptomatic patients. This test is for in vitro diagnostic use under the FDA EUA for laboratories certified under CLIA to perform high or moderate complexity testing. This test has not been FDA cleared or approved. A negative result does not rule out the presence of PCR inhibitors in the specimen or target RNA in concentration below the limit of detection for the assay. If only one viral target is positive but coinfection with multiple targets is suspected, the sample should be re-tested with another FDA cleared, approved, or authorized test, if coinfection would change clinical management. This test was validated by the [facility]. These laboratories are certified under the Clinical Laboratory Improvement Amendments of 1988 (CLIA-88) as qualified to perform high complexity laboratory testing.   Quantiferon-TB Gold Plus *Canceled*    Specimen: Peripheral Blood    Narrative    The following orders were created for panel order Quantiferon-TB Gold Plus.  Procedure                               Abnormality         Status                     ---------                               -----------         ------                     Quantiferon TB Gold Plus...[[id number]]                                                 Quantiferon TB Gold Plus...[[id number]]                                                 Quantiferon TB Gold Plus...[[id number]]                                                 Quantiferon TB Gold Plus...[[id number]]                                                    Please view results for these tests on the individual orders.   UA with Microscopic reflex to Culture    Specimen: Urine, Clean Catch   Result Value Ref Range    Color Urine Yellow Colorless, Straw, Light Yellow, Yellow    Appearance Urine Clear Clear    Glucose Urine Negative Negative mg/dL    Bilirubin Urine Negative Negative    Ketones Urine Negative Negative mg/dL    Specific Gravity Urine 1.013 1.003 - 1.035    Blood Urine Negative Negative    pH Urine 5.0 5.0 - 7.0    Protein Albumin Urine Negative Negative mg/dL    Urobilinogen Urine Normal Normal, 2.0 mg/dL    Nitrite Urine Negative Negative    Leukocyte Esterase Urine Negative Negative    Mucus Urine Present (A) None Seen /LPF    RBC Urine 1 <=2 /HPF    WBC Urine 1 <=5 /HPF    Narrative    Urine Culture not indicated   CT Chest (PE) Abdomen Pelvis w Contrast    Narrative    EXAM: CT CHEST PE ABDOMEN PELVIS W CONTRAST  LOCATION: [facility]  DATE: 1/10/2024    INDICATION: Acute hemoptysis, left chest pain, upper abdominal pain, history of alcohol abuse. Eval for PE, lung lesions, abd lesion, cirrhotic changes portal HTN.  COMPARISON: None.  TECHNIQUE: CT chest pulmonary angiogram and routine CT abdomen pelvis with IV contrast. Arterial phase through the chest and venous phase through the abdomen and pelvis. Multiplanar reformats and MIP reconstructions were performed. Dose reduction   techniques were used.   CONTRAST: 94 ml Isovue 370.    FINDINGS:  ANGIOGRAM CHEST: Pulmonary arteries are normal caliber and negative for pulmonary emboli. Thoracic aorta is negative for dissection. No CT evidence of right heart strain.     LUNGS AND PLEURA: Mosaic attenuation in the lung bases can be seen with air trapping.    MEDIASTINUM/AXILLAE: Normal.    CORONARY ARTERY CALCIFICATION: None.    HEPATOBILIARY: Cholecystectomy.    PANCREAS: Normal.    SPLEEN: Normal.    ADRENAL GLANDS: Normal.    KIDNEYS/BLADDER:  Normal.    BOWEL: Normal.    LYMPH NODES: Normal.    VASCULATURE: Unremarkable.    PELVIC ORGANS: Normal.    MUSCULOSKELETAL: Right-sided spondylolysis lumbosacral interspace. No subluxation.      Impression    IMPRESSION:  1.  Cholecystectomy.    2.  Mild mosaic attenuation in the lung bases can be seen with air trapping.    3.  No pulmonary embolism.       Medications   iopamidol (ISOVUE-370) solution 94 mL (94 mLs Intravenous $Given 1/10/24 2018)   sodium chloride 0.9 % bag 500mL for CT scan flush use (100 mLs As instructed $Given 1/10/24 2019)   acetaminophen (TYLENOL) tablet 975 mg (975 mg Oral $Given 1/10/24 1912)       Assessments & Plan (with Medical Decision Making)     MDM; Quan Quintero Yesy is a 38 year old male who presents from [facility] with pain in his left side that does not appear to be exertional and no associated dyspnea but cough and hemoptysis today.  No blood in the stool black tarry stools alcohol history.  History of a complicated prior surgery complications possibly.  No known varices or cirrhosis.  Plan for broad-based valve.  I did order D-dimer but with hemoptysis I would recommend still pursuing CT of the chest.  He is abdominal pain with this would recommend including the abdomen on the imaging.    Imaging is reassuring as are his labs.  I do not suspect this is more of a bronchitic cough.  I suspect the pain that he was having on the left side of the chest and abdomen is likely bowel related radiating to the chest.  I see no obvious red flags there was no further hemoptysis in the emergency department.  He appears to be stable to return.  My suspicion for serious underlying causes including communicable disease is quite low and I do not believe that he needs to quarantine himself at this time.  He had TB testing that is negative in the last year.  I did include QuantiFERON testing as part of the evaluation primarily because he is in a group setting but again I do not believe he needs to  quarantine currently.  Additional recommendations as below.  Precautions are given for return.    I have reviewed the nursing notes.    I have reviewed the findings, diagnosis, plan and need for follow up with the patient.           Medical Decision Making  The patient's presentation was of moderate complexity (an undiagnosed new problem with uncertain diagnosis).    The patient's evaluation involved:  ordering and/or review of 3+ test(s) in this encounter (see separate area of note for details)    The patient's management necessitated only low risk treatment.        New Prescriptions    No medications on file       Final diagnoses:   Blood-tinged sputum - reassuring evaluation. no findings of significant  hemoptysis.  Tb testing has been negative in the kast year and my  suspicioun is low for this, but I did order a quanitferon test and  it is pending.  again suspicioun for this is very low.  suspect upper respiratory source - either bronchitis or sinus.     Left-sided chest pain - unclear cause - no serious findinsg - suspect this was raditying from abdomen - likely bowel    Left upper quadrant abdominal pain - negative labs. reassuring  exam and CT,. consider bowel regimen. see attached   Alcohol use disorder in remission - under currentg treatment at  [facility]       1/10/2024   [facility] EMERGENCY DEPT       Richmond Bland MD  01/10/24 1355

## 2024-01-11 NOTE — DISCHARGE INSTRUCTIONS
ICD-10-CM    1. Blood-tinged sputum  R04.2     reassuring evaluation. no findings of significant  hemoptysis.  Tb testing has been negative in the [place] year and my  suspicioun is low for this, but I did order a quanitferon test and  it is pending.  again suspicioun for this is very low.  suspect upper respiratory source - either bronchitis or sinus.        2. Left-sided chest pain  R07.9     unclear cause - no serious findinsg - suspect this was raditying from abdomen - likely bowel       3. Left upper quadrant abdominal pain  R10.12     negative labs. reassuring  exam and CT,. consider bowel regimen. see attached      4. Alcohol use disorder in remission - under currentg treatment at  [facility]  F10.91         Bowel Regimen for Constipation:  Maintain 64 oz clear fluid per day indefinitely  Start with Fleets Enema x1 and hold for as long as possible (20-30 minutes)  Next take magnesium citrate 300 ml (one bottle) and stay close to bathroom for 6 hours  Next, on the next day start miralax 1 capful in 8 oz fluid daily for 7 days  Next, when better and while maintaining 64 oz fluid pre day, start fiber supplement, Citrucel or metamucil daily.

## 2024-01-11 NOTE — ED TRIAGE NOTES
Comes in from [facility], 1 week sober, RUQ pain, cough since last night with blood noted, some dizziness     Triage Assessment (Adult)       Row Name 01/10/24 8799          Triage Assessment    Airway WDL WDL        Respiratory WDL    Respiratory WDL cough     Cough Type productive  states blood        Peripheral/Neurovascular WDL    Peripheral Neurovascular WDL WDL        Cognitive/Neuro/Behavioral WDL    Cognitive/Neuro/Behavioral WDL WDL

## 2024-07-07 ENCOUNTER — HEALTH MAINTENANCE LETTER (OUTPATIENT)
Age: 39
End: 2024-07-07

## 2025-07-13 ENCOUNTER — HEALTH MAINTENANCE LETTER (OUTPATIENT)
Age: 40
End: 2025-07-13

## (undated) DEVICE — SYR 10ML LL W/O NDL

## (undated) DEVICE — PREP POVIDONE IODINE SCRUB 7.5% 120ML

## (undated) DEVICE — ESU ELEC NDL 1" COATED/INSULATED E1465

## (undated) DEVICE — SU MONOCRYL 4-0 RB-1 27" Y214H

## (undated) DEVICE — DECANTER TRANSFER DEVICE 2008S

## (undated) DEVICE — SOL WATER IRRIG 1000ML BOTTLE 2F7114

## (undated) DEVICE — ESU GROUND PAD E7506

## (undated) DEVICE — SYR 10ML FINGER CONTROL W/O NDL 309695

## (undated) DEVICE — DRAPE LAP W/ARMBOARD 29410

## (undated) DEVICE — RX BACITRACIN OINTMENT 0.9G 1/32OZ CUR001109

## (undated) DEVICE — PAD CHUX UNDERPAD 30X30"

## (undated) DEVICE — LINEN TOWEL PACK X5 5464

## (undated) DEVICE — BLADE KNIFE SURG 11 371111

## (undated) DEVICE — PACK MINOR CUSTOM ASC

## (undated) DEVICE — SOL NACL 0.9% IRRIG 1000ML BOTTLE 2F7124

## (undated) DEVICE — SU CHROMIC 3-0 SH 27" G122H

## (undated) DEVICE — PREP POVIDONE IODINE SOLUTION 10% 120ML

## (undated) DEVICE — DRSG KERLIX FLUFFS X5

## (undated) DEVICE — SPECIMEN CONTAINER 5OZ STERILE 2600SA

## (undated) DEVICE — NDL ANGIOCATH 14GA 1.25" 3068

## (undated) DEVICE — GLOVE PROTEXIS W/NEU-THERA 7.5  2D73TE75

## (undated) DEVICE — PREP SKIN SCRUB TRAY 4461A

## (undated) RX ORDER — GABAPENTIN 300 MG/1
CAPSULE ORAL
Status: DISPENSED
Start: 2017-06-20

## (undated) RX ORDER — PROPOFOL 10 MG/ML
INJECTION, EMULSION INTRAVENOUS
Status: DISPENSED
Start: 2017-06-20

## (undated) RX ORDER — HYDROCODONE BITARTRATE AND ACETAMINOPHEN 5; 325 MG/1; MG/1
TABLET ORAL
Status: DISPENSED
Start: 2017-06-20

## (undated) RX ORDER — FENTANYL CITRATE 50 UG/ML
INJECTION, SOLUTION INTRAMUSCULAR; INTRAVENOUS
Status: DISPENSED
Start: 2017-06-20

## (undated) RX ORDER — ACETAMINOPHEN 325 MG/1
TABLET ORAL
Status: DISPENSED
Start: 2017-06-20